# Patient Record
Sex: MALE | Race: BLACK OR AFRICAN AMERICAN
[De-identification: names, ages, dates, MRNs, and addresses within clinical notes are randomized per-mention and may not be internally consistent; named-entity substitution may affect disease eponyms.]

---

## 2018-12-17 ENCOUNTER — HOSPITAL ENCOUNTER (INPATIENT)
Dept: HOSPITAL 74 - YASAS | Age: 53
LOS: 4 days | Discharge: HOME | End: 2018-12-21
Attending: INTERNAL MEDICINE | Admitting: INTERNAL MEDICINE
Payer: COMMERCIAL

## 2018-12-17 VITALS — BODY MASS INDEX: 24 KG/M2

## 2018-12-17 DIAGNOSIS — F10.230: Primary | ICD-10-CM

## 2018-12-17 DIAGNOSIS — Z88.0: ICD-10-CM

## 2018-12-17 DIAGNOSIS — Z59.0: ICD-10-CM

## 2018-12-17 DIAGNOSIS — I10: ICD-10-CM

## 2018-12-17 DIAGNOSIS — F17.210: ICD-10-CM

## 2018-12-17 DIAGNOSIS — F19.24: ICD-10-CM

## 2018-12-17 DIAGNOSIS — F14.20: ICD-10-CM

## 2018-12-17 DIAGNOSIS — F32.9: ICD-10-CM

## 2018-12-17 DIAGNOSIS — F41.9: ICD-10-CM

## 2018-12-17 DIAGNOSIS — G47.00: ICD-10-CM

## 2018-12-17 DIAGNOSIS — E11.9: ICD-10-CM

## 2018-12-17 DIAGNOSIS — R74.0: ICD-10-CM

## 2018-12-17 PROCEDURE — HZ2ZZZZ DETOXIFICATION SERVICES FOR SUBSTANCE ABUSE TREATMENT: ICD-10-PCS | Performed by: INTERNAL MEDICINE

## 2018-12-17 RX ADMIN — Medication SCH: at 23:20

## 2018-12-17 SDOH — ECONOMIC STABILITY - HOUSING INSECURITY: HOMELESSNESS: Z59.0

## 2018-12-17 NOTE — HP
CIWA Score


Nausea/Vomiting: 3


Muscle Tremors: 3


Anxiety: 3


Agitation: 3


Paroxysmal Sweats: 2


Orientation: 2-Disoriented Date<2 days


Tacttile Disturbances: 0-None


Auditory Disturbances: 0-None


Visual Disturbances: 0-None


Headache: 0-None Present


CIWA-Ar Total Score: 16





- Admission Criteria


[facility] Guidelines: Admission for Medically Managed Detox: 


Requires at least one of the followin. CIWA greater than 12


2. Seizures within the past 24 hours


3. Delirium tremens within the past 24 hours


4. Hallucinations within the past 24 hours


5. Acute intervention needed for co  occurring medical disorder


6. Acute intervention needed for co  occurring psychiatric disorder


7. Severe withdrawal that cannot be handled at a lower level of care (continued


    vomiting, continued diarrhea, abnormal vital signs) requiring intravenous


    medication and/or fluids


8. Pregnancy








Admission ROS [facility]





- [facility] [place]


Chief Complaint: 





Alcohol withdrawal symptoms


Allergies/Adverse Reactions: 


 Allergies











Allergy/AdvReac Type Severity Reaction Status Date / Time


 


Penicillins Allergy Severe Hives Verified 18 20:31











History of Present Illness: 





53 years old male with a long history of alcohol dependence is seeking 

admission to detox. Patient has been in previous detox at [facility] and reports 3 years of sobriety. He has medical history of 

hypertension, anxiety and depression. He denies suicide attempt and suicidal 

ideation at this time. This his first admission to [facility]


Exam Limitations: No Limitations





- Ebola screening


Have you traveled outside of the country in the last 21 days: No


Have you had contact with anyone from an Ebola affected area: No


Have you been sick,other than usual withdrawal symptoms: No


Do you have a fever: No





- Review of Systems


Constitutional: Loss of Appetite, Malaise, Changes in sleep, Weakness


EENT: reports: Nose Congestion


Respiratory: reports: No Symptoms reported


Cardiac: reports: No Symptoms Reported


GI: reports: Poor Appetite, Poor Fluid Intake, Vomiting (x 1), Abdominal 

cramping


: reports: No Symptoms Reported


Musculoskeletal: reports: Joint Pain, Muscle Pain


Integumentary: reports: Dryness, Flushing


Neuro: reports: Tingling, Tremors


Endocrine: reports: No Symptoms Reported


Hematology: reports: No Symptoms Reported


Psychiatric: reports: Mood/Affect Appropiate, Anxious, Depressed


Other Systems: Reviewed and Negative





Patient History





- Patient Medical History


Hx Anemia: No


Hx Asthma: No


Hx Chronic Obstructive Pulmonary Disease (COPD): No


Hx Cancer: No


Hx Cardiac Disorders: No


Hx Congestive Heart Failure: No


Hx Hypertension: Yes


Hx Hypercholesterolemia: No


Hx Pacemaker: No


HX Cerebrovascular Accident: No


Hx Seizures: No


Hx Dementia: No


Hx Diabetes: No


Hx Gastrointestinal Disorders: No


Hx Liver Disease: No


Hx Genitourinary Disorders: No


Hx Sexually Transmitted Disorders: No


Hx Renal Disease (ESRD): No


Hx Thyroid Disease: No


Hx Human Immunodeficiency Virus (HIV): No (Negative )


Hx Hepatitis C: No


Hx Depression: Yes (Remeron)


Hx Suicide Attempt: No


Hx Bipolar Disorder: Yes


Hx Schizophrenia: Yes


Other Medical History: Anxiety - Not on medi cation





- Patient Surgical History


Past Surgical History: No





- PPD History


Previous Implant?: Yes


Documented Results: Negative w/o proof


Implanted On Prior [facility] Admission?: No


PPD to be Administered?: Yes





- Reproductive History


Patient is a Female of Child Bearing Age (11 -55 yrs old): No (MALE)





- Smoking Cessation


Smoking history: Current every day smoker


Have you smoked in the past 12 months: Yes


Aproximately how many cigarettes per day: 20


Hx Chewing Tobacco Use: No


Initiated information on smoking cessation: Yes


'Breaking Loose' booklet given: 18





- Substance & Tx. History


Hx Alcohol Use: Yes


Hx Substance Use: Yes


Substance Use Type: Alcohol, Cocaine


Hx Substance Use Treatment: Yes ([facility]





- Substances Abused


  ** Alcohol


Route: Oral


Frequency: Daily


Amount used: VODKA - 2 PINTS, MALT LIQUOR 6 CANS


Age of first use: 13


Date of Last Use: 18





  ** Cocaine


Route: Smoking


Frequency: Daily


Amount used: 8  BAGS


Age of first use: 13


Date of Last Use: 18





Family Disease History





- Family Disease History


Family Disease History: Diabetes: Mother (), Heart Disease: Mother, 

Other: Father (HEPATITIS C - )





Admission Physical Exam [facility]





- Vital Signs


Vital Signs: 


 Vital Signs - 24 hr











  18





  19:09


 


Temperature 99.1 F


 


Pulse Rate 83


 


Respiratory 20





Rate 


 


Blood Pressure 131/72














- Physical


General Appearance: Yes: Moderate Distress, Tremorous, Irritable, Anxious


HEENTM: Yes: Nasal Congestion


Respiratory: Yes: Lungs Clear, Normal Breath Sounds, No Respiratory Distress


Neck: Yes: Supple


Breast: Yes: Breast Exam Deferred


Cardiology: Yes: Regular Rhythm, Regular Rate


Abdominal: Yes: Normal Bowel Sounds


Genitourinary: Yes: Within Normal Limits


Back: Yes: Normal Inspection


Musculoskeletal: Yes: Joint swelling, Muscle Pain


Extremities: Yes: Tremors


Neurological: Yes: Alert, Normal Mood/Affect


Integumentary: Yes: Warm


Lymphatic: Yes: Within Normal Limits





- Diagnostic


(1) Alcohol dependence with uncomplicated withdrawal


Current Visit: Yes   Status: Chronic   





(2) Nicotine dependence


Current Visit: Yes   Status: Chronic   





(3) Hypertension


Current Visit: Yes   Status: Chronic   


Qualifiers: 


   Hypertension type: essential hypertension   Qualified Code(s): I10 - 

Essential (primary) hypertension   





(4) Depression


Current Visit: Yes   Status: Chronic   


Qualifiers: 


   Depression Type: unspecified   Qualified Code(s): F32.9 - Major depressive 

disorder, single episode, unspecified   





(5) Anxiety


Current Visit: Yes   Status: Chronic   





Cleared for Admission S





- Detox or Rehab


[facility] Level of Care: Medically Managed


Detox Regimen/Protocol: Librium





BHS Breath Alcohol Content


Breath Alcohol Content: 0





Urine Drug Screen





- Results


Drug Screen Negative: No


Urine Drug Screen Results: HOLGER-Cocaine

## 2018-12-18 LAB
ALBUMIN SERPL-MCNC: 3.6 G/DL (ref 3.4–5)
ALP SERPL-CCNC: 116 U/L (ref 45–117)
ALT SERPL-CCNC: 59 U/L (ref 13–61)
ANION GAP SERPL CALC-SCNC: 7 MMOL/L (ref 8–16)
AST SERPL-CCNC: 217 U/L (ref 15–37)
BILIRUB SERPL-MCNC: 0.4 MG/DL (ref 0.2–1)
BUN SERPL-MCNC: 15 MG/DL (ref 7–18)
CALCIUM SERPL-MCNC: 8.9 MG/DL (ref 8.5–10.1)
CHLORIDE SERPL-SCNC: 101 MMOL/L (ref 98–107)
CO2 SERPL-SCNC: 29 MMOL/L (ref 21–32)
CREAT SERPL-MCNC: 1.1 MG/DL (ref 0.55–1.3)
DEPRECATED RDW RBC AUTO: 15.5 % (ref 11.9–15.9)
GLUCOSE SERPL-MCNC: 134 MG/DL (ref 74–106)
HCT VFR BLD CALC: 38.7 % (ref 35.4–49)
HGB BLD-MCNC: 13.4 GM/DL (ref 11.7–16.9)
MCH RBC QN AUTO: 30.6 PG (ref 25.7–33.7)
MCHC RBC AUTO-ENTMCNC: 34.6 G/DL (ref 32–35.9)
MCV RBC: 88.4 FL (ref 80–96)
PLATELET # BLD AUTO: 319 K/MM3 (ref 134–434)
PMV BLD: 9.1 FL (ref 7.5–11.1)
POTASSIUM SERPLBLD-SCNC: 3.8 MMOL/L (ref 3.5–5.1)
PROT SERPL-MCNC: 8.2 G/DL (ref 6.4–8.2)
RBC # BLD AUTO: 4.38 M/MM3 (ref 4–5.6)
SODIUM SERPL-SCNC: 137 MMOL/L (ref 136–145)
WBC # BLD AUTO: 4.7 K/MM3 (ref 4–10)

## 2018-12-18 RX ADMIN — MIRTAZAPINE SCH MG: 15 TABLET, FILM COATED ORAL at 22:08

## 2018-12-18 RX ADMIN — Medication SCH MG: at 22:08

## 2018-12-18 RX ADMIN — Medication SCH TAB: at 10:10

## 2018-12-18 RX ADMIN — NICOTINE SCH: 14 PATCH, EXTENDED RELEASE TRANSDERMAL at 10:12

## 2018-12-18 NOTE — CONSULT
[facility] Psychiatric Consult





- Data


Date of interview: 12/18/18


Admission source: [facility]


Identifying data: First admission to [facility] for this 54 y/o AA male seeking 

detoxification treatment for alcohol and cocaine dependence. Patient is 

, a father of one, homeless, unemployed and supported on SSI benefits.


Substance Abuse History: Confirmed by the patient in this session. Details in 

current BHS report : Smoking history: Current every day smoker.  Have you 

smoked in the past 12 months: Yes.  Aproximately how many cigarettes per day: 

20.  Hx Chewing Tobacco Use: No.  Initiated information on smoking cessation: 

Yes.  'Breaking Loose' booklet given: 12/17/18.  - Substance & Tx. History.  Hx 

Alcohol Use: Yes.  Hx Substance Use: Yes.  Substance Use Type: Alcohol, 

Cocaine.  Hx Substance Use Treatment: Yes ([facility]).  - 

Substances Abused.  ** Alcohol.  Route: Oral.  Frequency: Daily.  Amount used: 

VODKA - 2 PINTS, MALT LIQUOR 6 CANS.  Age of first use: 13.  Date of Last Use: 

12/17/18.  ** Cocaine.  Route: Smoking.  Frequency: Daily.  Amount used: 8  

BAGS.  Age of first use: 13.  Date of Last Use: 12/16/18


Medical History: Hypertension.


Psychiatric History: History of psychiatric hospitalizations. Patient admits to 

the diagnosis of Schizoaffective Disorder. Medicated with lithium + 

mirtazapine. Mr Castaneda indicates that he sees a private psychiatrist, in [facility], 

for medication management. Sub-optimal adherence reported. Patient endorses one 

suicide attempt via overdose with medications ([id number]).


Physical/Sexual Abuse/Trauma History: Patient denies.


Additional Comment: Urine Drug Screen Results: HOLGER-Cocaine. Noted.





Mental Status Exam





- Mental Status Exam


Alert and Oriented to: Time, Place, Person


Cognitive Function: Good


Patient Appearance: Well Groomed


Mood: Nervous, Withdrawn


Affect: Mood Congruent


Patient Behavior: Talkative, Cooperative


Speech Pattern: Clear


Voice Loudness: Normal


Thought Process: Goal Oriented


Thought Disorder: Not Present


Hallucinations: Denies


Suicidal Ideation: Denies


Homicidal Ideation: Denies


Insight/Judgement: Poor


Sleep: Poorly, Difficulty falling asleep


Appetite: Good


Muscle strength/Tone: Normal


Gait/Station: Normal





Psychiatric Findings





- Problem List (Axis 1, 2,3)


(1) Alcohol dependence with uncomplicated withdrawal


Current Visit: Yes   Status: Acute   





(2) Cocaine dependence


Current Visit: Yes   Status: Chronic   





(3) Nicotine dependence


Current Visit: Yes   Status: Chronic   





(4) Substance induced mood disorder


Current Visit: Yes   Status: Chronic   





(5) Insomnia


Current Visit: Yes   Status: Chronic   





- Initial Treatment Plan


Initial Treatment Plan: Psychoeducation. Sleep hygiene. Detoxification in 

progress. Remeron 7.5 mg po hs. Side effects/benefits discussed with the 

patient. Lithium level : requested/now pending. Will follow. AA meetings. 

Observation.

## 2018-12-18 NOTE — PN
[facility] CIWA





- CIWA Score


Nausea/Vomitin-No Nausea/No Vomiting


Muscle Tremors: 3


Anxiety: 3


Agitation: 3


Paroxysmal Sweats: 3


Orientation: 0-Oriented


Tacttile Disturbances: 0-None


Auditory Disturbances: 0-None


Visual Disturbances: 0-None


Headache: 1-Very Mild


CIWA-Ar Total Score: 13





BHS Progress Note (SOAP)


Subjective: 





agitation


sweats


shakes


interrupted sleep


body aches


Objective: 





18 11:46


 Vital Signs











Temperature  98.2 F   18 09:17


 


Pulse Rate  88   18 09:17


 


Respiratory Rate  16   18 09:17


 


Blood Pressure  153/90   18 09:17


 


O2 Sat by Pulse Oximetry (%)      








 Laboratory Tests











  18





  07:00 07:00


 


WBC  4.7 


 


RBC  4.38 


 


Hgb  13.4 


 


Hct  38.7 


 


MCV  88.4 


 


MCH  30.6 


 


MCHC  34.6 


 


RDW  15.5 


 


Plt Count  319 


 


MPV  9.1 


 


Sodium   137


 


Potassium   3.8


 


Chloride   101


 


Carbon Dioxide   29


 


Anion Gap   7 L


 


BUN   15


 


Creatinine   1.1


 


Creat Clearance w eGFR   > 60


 


Random Glucose   134 H


 


Calcium   8.9


 


Total Bilirubin   0.4


 


AST   217 H


 


ALT   59


 


Alkaline Phosphatase   116


 


Total Protein   8.2


 


Albumin   3.6








labs noted


elevated AST; tylenol d/c


aaox3


lying in bed


no acute distress


Assessment: 





18 11:47


withdrawal sx


Plan: 





continue detox


increase fluids

## 2018-12-19 RX ADMIN — MIRTAZAPINE SCH MG: 15 TABLET, FILM COATED ORAL at 22:18

## 2018-12-19 RX ADMIN — NICOTINE SCH: 14 PATCH, EXTENDED RELEASE TRANSDERMAL at 10:20

## 2018-12-19 RX ADMIN — Medication SCH TAB: at 10:19

## 2018-12-19 RX ADMIN — Medication SCH MG: at 22:19

## 2018-12-19 NOTE — PN
S CIWA





- CIWA Score


Nausea/Vomitin


Muscle Tremors: 2


Anxiety: 1-Mildly Anxious


Agitation: 1-Slight > Activity


Paroxysmal Sweats: 2


Orientation: 0-Oriented


Tacttile Disturbances: 1-Very Mild Itch/Numbness


Auditory Disturbances: 0-None


Visual Disturbances: 0-None


Headache: 0-None Present


CIWA-Ar Total Score: 9





BHS Progress Note (SOAP)


Subjective: 





interrupted sleep, sweats, diarrhea 


Objective: 





18 11:30


 Vital Signs











Temperature  98.4 F   18 09:05


 


Pulse Rate  73   18 09:05


 


Respiratory Rate  18   18 09:05


 


Blood Pressure  152/96   18 09:05


 


O2 Sat by Pulse Oximetry (%)      








 Laboratory Tests











  18





  07:00 07:00 07:00


 


WBC  4.7  


 


RBC  4.38  


 


Hgb  13.4  


 


Hct  38.7  


 


MCV  88.4  


 


MCH  30.6  


 


MCHC  34.6  


 


RDW  15.5  


 


Plt Count  319  


 


MPV  9.1  


 


Sodium   137 


 


Potassium   3.8 


 


Chloride   101 


 


Carbon Dioxide   29 


 


Anion Gap   7 L 


 


BUN   15 


 


Creatinine   1.1 


 


Creat Clearance w eGFR   > 60 


 


Random Glucose   134 H 


 


Calcium   8.9 


 


Total Bilirubin   0.4 


 


AST   217 H 


 


ALT   59 


 


Alkaline Phosphatase   116 


 


Total Protein   8.2 


 


Albumin   3.6 


 


RPR Titer    Nonreactive








pt aox3 lying in bed 


Assessment: 





18 11:30


withdrawal sx;s 


elevated ast


glucose 134


ekg rbbb- no previous study 


18 11:33





18 11:33





Plan: 





cont detox


increase fluids 


hga1c


bgm

## 2018-12-19 NOTE — EKG
Test Reason : 

Blood Pressure : ***/*** mmHG

Vent. Rate : 074 BPM     Atrial Rate : 074 BPM

   P-R Int : 146 ms          QRS Dur : 148 ms

    QT Int : 430 ms       P-R-T Axes : 033 103 036 degrees

   QTc Int : 477 ms

 

NORMAL SINUS RHYTHM

RIGHT BUNDLE BRANCH BLOCK

ABNORMAL ECG

NO PREVIOUS ECGS AVAILABLE

Confirmed by CORINE PEREIRA, JUANA ([id number]) on 12/19/2018 1:36:16 PM

 

Referred By:             Confirmed By:JUANA POOL MD

## 2018-12-20 RX ADMIN — NICOTINE SCH: 14 PATCH, EXTENDED RELEASE TRANSDERMAL at 10:43

## 2018-12-20 RX ADMIN — HYDROCHLOROTHIAZIDE SCH MG: 12.5 CAPSULE ORAL at 12:18

## 2018-12-20 RX ADMIN — MIRTAZAPINE SCH MG: 15 TABLET, FILM COATED ORAL at 22:45

## 2018-12-20 RX ADMIN — Medication SCH MG: at 22:46

## 2018-12-20 RX ADMIN — Medication SCH TAB: at 10:41

## 2018-12-20 RX ADMIN — HYDROCHLOROTHIAZIDE SCH MG: 12.5 CAPSULE ORAL at 22:46

## 2018-12-20 NOTE — PN
BHS Progress Note (SOAP)


Subjective: 





sweats


tired


interrupted sleep


Objective: 





12/20/18 11:16


 Vital Signs











Temperature  98.2 F   12/20/18 09:12


 


Pulse Rate  85   12/20/18 09:12


 


Respiratory Rate  16   12/20/18 09:12


 


Blood Pressure  157/104 H  12/20/18 09:12


 


O2 Sat by Pulse Oximetry (%)      








BP rechecked it is 148/96. pt states he was on BP medication and hasn't been 

taking it however told pt a low dose of htn medication will be started during 

his stay to manage his HTN. pt in agreement


Assessment: 





12/20/18 11:19


mild withdrawal sx


Plan: 





continue detox


HCTZ 12.5mg bid ordered


d/c in am

## 2018-12-21 VITALS — DIASTOLIC BLOOD PRESSURE: 87 MMHG | HEART RATE: 95 BPM | TEMPERATURE: 99 F | SYSTOLIC BLOOD PRESSURE: 156 MMHG

## 2018-12-21 NOTE — PN
BHS Progress Note (SOAP)


Subjective: 





i'm better


Objective: 





12/21/18 08:44


 Vital Signs











Temperature  99.3 F   12/21/18 06:00


 


Pulse Rate  89   12/21/18 06:00


 


Respiratory Rate  18   12/21/18 06:00


 


Blood Pressure  159/94   12/21/18 06:00


 


O2 Sat by Pulse Oximetry (%)      








 Laboratory Tests











  12/18/18 12/18/18 12/18/18





  07:00 07:00 07:00


 


WBC  4.7  


 


RBC  4.38  


 


Hgb  13.4  


 


Hct  38.7  


 


MCV  88.4  


 


MCH  30.6  


 


MCHC  34.6  


 


RDW  15.5  


 


Plt Count  319  


 


MPV  9.1  


 


Sodium   137 


 


Potassium   3.8 


 


Chloride   101 


 


Carbon Dioxide   29 


 


Anion Gap   7 L 


 


BUN   15 


 


Creatinine   1.1 


 


Creat Clearance w eGFR   > 60 


 


Random Glucose   134 H 


 


Hemoglobin A1c %   


 


Calcium   8.9 


 


Total Bilirubin   0.4 


 


AST   217 H 


 


ALT   59 


 


Alkaline Phosphatase   116 


 


Total Protein   8.2 


 


Albumin   3.6 


 


Lithium   


 


RPR Titer    Nonreactive














  12/19/18 12/20/18





  06:00 07:30


 


WBC  


 


RBC  


 


Hgb  


 


Hct  


 


MCV  


 


MCH  


 


MCHC  


 


RDW  


 


Plt Count  


 


MPV  


 


Sodium  


 


Potassium  


 


Chloride  


 


Carbon Dioxide  


 


Anion Gap  


 


BUN  


 


Creatinine  


 


Creat Clearance w eGFR  


 


Random Glucose  


 


Hemoglobin A1c %   6.5 H


 


Calcium  


 


Total Bilirubin  


 


AST  


 


ALT  


 


Alkaline Phosphatase  


 


Total Protein  


 


Albumin  


 


Lithium  0 L 


 


RPR Titer  








pt aox3in nad ambulating 


Assessment: 





12/21/18 08:45


detox completed


dm 


htn 


Plan: 


d/c today 


cont fluids

## 2018-12-21 NOTE — DS
BHS Detox Discharge Summary


Admission Date: 


12/17/18





Discharge Date: 12/21/18





- History


Present History: Alcohol Dependence, Cocaine Dependence





- Physical Exam Results


Vital Signs: 


 Vital Signs











Temperature  99.3 F   12/21/18 06:00


 


Pulse Rate  89   12/21/18 06:00


 


Respiratory Rate  18   12/21/18 06:00


 


Blood Pressure  159/94   12/21/18 06:00


 


O2 Sat by Pulse Oximetry (%)      














- Treatment


Hospital Course: Detox Protocol Followed, Detoxed Safely, Responded well, 

Discharged Condition Good





- Medication


Discharge Medications: 


Ambulatory Orders





Lithium Carbonate [Eskalith -] 150 mg PO BID 12/17/18 


Mirtazapine [Remeron -] 7.5 mg PO DAILY 12/17/18 


Hydrochlorothiazide [Hctz -] 12.5 mg PO BID 30 Days #60 cap 12/21/18 











- Diagnosis


(1) Alcohol dependence with uncomplicated withdrawal


Current Visit: Yes   Status: Chronic   





(2) Cocaine dependence


Current Visit: Yes   Status: Chronic   





(3) Depression


Current Visit: Yes   Status: Chronic   


Qualifiers: 


   Depression Type: unspecified   Qualified Code(s): F32.9 - Major depressive 

disorder, single episode, unspecified   





(4) Hypertension


Current Visit: Yes   Status: Chronic   


Qualifiers: 


   Hypertension type: essential hypertension   Qualified Code(s): I10 - 

Essential (primary) hypertension   





(5) Nicotine dependence


Current Visit: Yes   Status: Chronic   





- AMA


Did Patient Leave Against Medical Advice: No